# Patient Record
Sex: MALE | Race: WHITE | Employment: FULL TIME | URBAN - METROPOLITAN AREA
[De-identification: names, ages, dates, MRNs, and addresses within clinical notes are randomized per-mention and may not be internally consistent; named-entity substitution may affect disease eponyms.]

---

## 2018-03-21 ENCOUNTER — HOSPITAL ENCOUNTER (OUTPATIENT)
Age: 58
Discharge: HOME | End: 2018-03-23
Payer: COMMERCIAL

## 2018-03-21 LAB
ALBUMIN SERPL-MCNC: 4.2 G/DL (ref 3.5–5.2)
ALP BLD-CCNC: 62 U/L (ref 40–129)
ALT SERPL-CCNC: 25 U/L (ref 0–40)
ANION GAP SERPL CALCULATED.3IONS-SCNC: 17 MMOL/L (ref 7–16)
AST SERPL-CCNC: 26 U/L (ref 0–39)
BASOPHILS ABSOLUTE: 0.05 E9/L (ref 0–0.2)
BASOPHILS RELATIVE PERCENT: 0.5 % (ref 0–2)
BILIRUB SERPL-MCNC: 0.2 MG/DL (ref 0–1.2)
BUN BLDV-MCNC: 17 MG/DL (ref 6–20)
CALCIUM SERPL-MCNC: 9.2 MG/DL (ref 8.6–10.2)
CHLORIDE BLD-SCNC: 101 MMOL/L (ref 98–107)
CHOLESTEROL, TOTAL: 248 MG/DL (ref 0–199)
CO2: 22 MMOL/L (ref 22–29)
CREAT SERPL-MCNC: 1.1 MG/DL (ref 0.7–1.2)
EOSINOPHILS ABSOLUTE: 0.43 E9/L (ref 0.05–0.5)
EOSINOPHILS RELATIVE PERCENT: 4.4 % (ref 0–6)
GFR AFRICAN AMERICAN: >60
GFR NON-AFRICAN AMERICAN: >60 ML/MIN/1.73
GLUCOSE BLD-MCNC: 145 MG/DL (ref 74–109)
HBA1C MFR BLD: 7.4 % (ref 4.8–5.9)
HCT VFR BLD CALC: 42.6 % (ref 37–54)
HDLC SERPL-MCNC: 31 MG/DL
HEMOGLOBIN: 13.8 G/DL (ref 12.5–16.5)
IMMATURE GRANULOCYTES #: 0.06 E9/L
IMMATURE GRANULOCYTES %: 0.6 % (ref 0–5)
LDL CHOLESTEROL CALCULATED: ABNORMAL MG/DL (ref 0–99)
LYMPHOCYTES ABSOLUTE: 2.54 E9/L (ref 1.5–4)
LYMPHOCYTES RELATIVE PERCENT: 26.2 % (ref 20–42)
MCH RBC QN AUTO: 28.5 PG (ref 26–35)
MCHC RBC AUTO-ENTMCNC: 32.4 % (ref 32–34.5)
MCV RBC AUTO: 88 FL (ref 80–99.9)
MONOCYTES ABSOLUTE: 0.77 E9/L (ref 0.1–0.95)
MONOCYTES RELATIVE PERCENT: 8 % (ref 2–12)
NEUTROPHILS ABSOLUTE: 5.83 E9/L (ref 1.8–7.3)
NEUTROPHILS RELATIVE PERCENT: 60.3 % (ref 43–80)
PDW BLD-RTO: 13.8 FL (ref 11.5–15)
PLATELET # BLD: 189 E9/L (ref 130–450)
PMV BLD AUTO: 12.8 FL (ref 7–12)
POTASSIUM SERPL-SCNC: 4.3 MMOL/L (ref 3.5–5)
PROSTATE SPECIFIC ANTIGEN: 0.35 NG/ML (ref 0–4)
RBC # BLD: 4.84 E12/L (ref 3.8–5.8)
SODIUM BLD-SCNC: 140 MMOL/L (ref 132–146)
TOTAL PROTEIN: 6.8 G/DL (ref 6.4–8.3)
TRIGL SERPL-MCNC: 439 MG/DL (ref 0–149)
TSH SERPL DL<=0.05 MIU/L-ACNC: 2.89 UIU/ML (ref 0.27–4.2)
VITAMIN D 25-HYDROXY: 33 NG/ML (ref 30–100)
VLDLC SERPL CALC-MCNC: ABNORMAL MG/DL
WBC # BLD: 9.7 E9/L (ref 4.5–11.5)

## 2018-03-21 PROCEDURE — G0103 PSA SCREENING: HCPCS

## 2018-03-21 PROCEDURE — 80053 COMPREHEN METABOLIC PANEL: CPT

## 2018-03-21 PROCEDURE — 85025 COMPLETE CBC W/AUTO DIFF WBC: CPT

## 2018-03-21 PROCEDURE — 80061 LIPID PANEL: CPT

## 2018-03-21 PROCEDURE — 82306 VITAMIN D 25 HYDROXY: CPT

## 2018-03-21 PROCEDURE — 83036 HEMOGLOBIN GLYCOSYLATED A1C: CPT

## 2018-03-21 PROCEDURE — 84443 ASSAY THYROID STIM HORMONE: CPT

## 2018-06-30 ENCOUNTER — HOSPITAL ENCOUNTER (EMERGENCY)
Age: 58
Discharge: HOME | End: 2018-06-30
Payer: COMMERCIAL

## 2018-06-30 ENCOUNTER — APPOINTMENT (OUTPATIENT)
Dept: GENERAL RADIOLOGY | Age: 58
End: 2018-06-30
Payer: COMMERCIAL

## 2018-06-30 VITALS
HEIGHT: 67 IN | SYSTOLIC BLOOD PRESSURE: 157 MMHG | TEMPERATURE: 97.8 F | DIASTOLIC BLOOD PRESSURE: 90 MMHG | HEART RATE: 84 BPM | BODY MASS INDEX: 33.74 KG/M2 | RESPIRATION RATE: 20 BRPM | WEIGHT: 215 LBS | OXYGEN SATURATION: 98 %

## 2018-06-30 DIAGNOSIS — M54.50 ACUTE EXACERBATION OF CHRONIC LOW BACK PAIN: ICD-10-CM

## 2018-06-30 DIAGNOSIS — G89.29 ACUTE EXACERBATION OF CHRONIC LOW BACK PAIN: ICD-10-CM

## 2018-06-30 DIAGNOSIS — M54.31 SCIATICA OF RIGHT SIDE: Primary | ICD-10-CM

## 2018-06-30 PROCEDURE — 72110 X-RAY EXAM L-2 SPINE 4/>VWS: CPT

## 2018-06-30 PROCEDURE — 6360000002 HC RX W HCPCS: Performed by: NURSE PRACTITIONER

## 2018-06-30 PROCEDURE — 99212 OFFICE O/P EST SF 10 MIN: CPT

## 2018-06-30 PROCEDURE — 96372 THER/PROPH/DIAG INJ SC/IM: CPT

## 2018-06-30 RX ORDER — NAPROXEN 500 MG/1
500 TABLET ORAL 2 TIMES DAILY
Qty: 14 TABLET | Refills: 0 | Status: SHIPPED | OUTPATIENT
Start: 2018-06-30 | End: 2018-07-07

## 2018-06-30 RX ORDER — TRAMADOL HYDROCHLORIDE 50 MG/1
50 TABLET ORAL EVERY 6 HOURS PRN
Qty: 12 TABLET | Refills: 0 | Status: SHIPPED | OUTPATIENT
Start: 2018-06-30 | End: 2018-07-03

## 2018-06-30 RX ORDER — METHYLPREDNISOLONE 4 MG/1
TABLET ORAL
Qty: 21 TABLET | Status: SHIPPED | OUTPATIENT
Start: 2018-06-30 | End: 2018-07-06

## 2018-06-30 RX ORDER — KETOROLAC TROMETHAMINE 30 MG/ML
30 INJECTION, SOLUTION INTRAMUSCULAR; INTRAVENOUS ONCE
Status: COMPLETED | OUTPATIENT
Start: 2018-06-30 | End: 2018-06-30

## 2018-06-30 RX ADMIN — KETOROLAC TROMETHAMINE 30 MG: 30 INJECTION, SOLUTION INTRAMUSCULAR at 19:28

## 2018-06-30 ASSESSMENT — PAIN DESCRIPTION - ONSET
ONSET: GRADUAL
ONSET: GRADUAL

## 2018-06-30 ASSESSMENT — PAIN DESCRIPTION - FREQUENCY
FREQUENCY: CONTINUOUS
FREQUENCY: CONTINUOUS

## 2018-06-30 ASSESSMENT — PAIN DESCRIPTION - LOCATION
LOCATION: BACK
LOCATION: BACK

## 2018-06-30 ASSESSMENT — PAIN DESCRIPTION - PAIN TYPE
TYPE: ACUTE PAIN
TYPE: ACUTE PAIN

## 2018-06-30 ASSESSMENT — PAIN DESCRIPTION - DESCRIPTORS
DESCRIPTORS: ACHING;DULL;SHARP;SHOOTING
DESCRIPTORS: ACHING;DULL

## 2018-06-30 ASSESSMENT — PAIN DESCRIPTION - PROGRESSION
CLINICAL_PROGRESSION: GRADUALLY WORSENING
CLINICAL_PROGRESSION: GRADUALLY IMPROVING

## 2018-06-30 ASSESSMENT — PAIN SCALES - GENERAL
PAINLEVEL_OUTOF10: 6
PAINLEVEL_OUTOF10: 4
PAINLEVEL_OUTOF10: 6

## 2018-06-30 ASSESSMENT — PAIN DESCRIPTION - ORIENTATION
ORIENTATION: LOWER
ORIENTATION: LOWER

## 2018-06-30 NOTE — ED PROVIDER NOTES
post laminectomy and posterior fusion at the L4-5 level. 3. Degenerative disc disease at the L5-S1 level.          ------------------------- NURSING NOTES AND VITALS REVIEWED ---------------------------   The nursing notes within the ED encounter and vital signs as below have been reviewed. BP (!) 157/90   Pulse 84   Temp 97.8 °F (36.6 °C) (Oral)   Resp 20   Ht 5' 7\" (1.702 m)   Wt 215 lb (97.5 kg)   SpO2 98%   BMI 33.67 kg/m²   Oxygen Saturation Interpretation: Normal       ---------------------------------------------------PHYSICAL EXAM--------------------------------------      Constitutional/General: Alert and oriented x3, well appearing, non toxic in NAD  Head: Normocephalic and atraumatic  Eyes: Conjunctiva clear   Mouth: Oropharynx clear, handling secretions, no trismus  Neck: Supple, full ROM,   Pulmonary: Lungs clear to auscultation bilaterally, no wheezes, rales, or rhonchi. Not in respiratory distress  Cardiovascular:  Regular rate and rhythm, no murmurs, gallops, or rubs. 2+ distal pulses  Abdomen: Soft, non tender, non distended,   Extremities: Moves all extremities x 4. Warm and well perfused, has 5+ strength in the bilateral lower extremities he has pain with straight leg raising at 30°. No abnormal warmth palpable over the lumbar spine. There is no erythema. Positive pedal pulses normal color warmth and sensation present to the feet and toes.   Skin: warm and dry without rash  Neurologic: GCS 15,  Psych: Normal Affect      ------------------------------ ED COURSE/MEDICAL DECISION MAKING----------------------  Medications   ketorolac (TORADOL) injection 30 mg (30 mg Intramuscular Given 6/30/18 1928)         ED COURSE:       Medical Decision Making:    Patient with low back pain with some radiation down the right leg with a history of back pain many years ago had a surgery a few years ago and has been fine since but when he sat in a chair [employer]ori[employer] and got up he developed pain and he's had pain ever since. With movement. X-ray of the lumbar spine will be obtained. He was given Toradol 30 IM. He states the Toradol did give him some relief he feels improved movement and he feels better x-ray showed degenerative changes but no acute fractures. Patient will see his surgeon on Monday who did his back surgery artery has an appointment with Dr. Shannon Lester. I did tell him if he develops any danger signs such as fever, saddle anesthesia, urinary incontinence or symptoms he should be reevaluated. Did order him a Medrol Dosepak also Naprosyn to take when necessary for pain and also a few Ultram to take if the pain was not relieved with the above medications. I did search search the [employer] [address] prior to prescribing Ultram.    Counseling: The emergency provider has spoken with the patient and discussed todays results, in addition to providing specific details for the plan of care and counseling regarding the diagnosis and prognosis. Questions are answered at this time and they are agreeable with the plan.      --------------------------------- IMPRESSION AND DISPOSITION ---------------------------------    IMPRESSION  1. Sciatica of right side    2. Acute exacerbation of chronic low back pain        DISPOSITION  Disposition: Discharge to home  Patient condition is good      NOTE: This report was transcribed using voice recognition software.  Every effort was made to ensure accuracy; however, inadvertent computerized transcription errors may be present     ROMULO Cruz - SONIA  07/01/18 [id number]

## 2018-09-26 ENCOUNTER — HOSPITAL ENCOUNTER (OUTPATIENT)
Age: 58
Discharge: HOME | End: 2018-09-28
Payer: COMMERCIAL

## 2018-09-26 LAB
ALBUMIN SERPL-MCNC: 4.4 G/DL (ref 3.5–5.2)
ALP BLD-CCNC: 60 U/L (ref 40–129)
ALT SERPL-CCNC: 21 U/L (ref 0–40)
ANION GAP SERPL CALCULATED.3IONS-SCNC: 18 MMOL/L (ref 7–16)
AST SERPL-CCNC: 23 U/L (ref 0–39)
BASOPHILS ABSOLUTE: 0.04 E9/L (ref 0–0.2)
BASOPHILS RELATIVE PERCENT: 0.5 % (ref 0–2)
BILIRUB SERPL-MCNC: 0.2 MG/DL (ref 0–1.2)
BUN BLDV-MCNC: 12 MG/DL (ref 6–20)
CALCIUM SERPL-MCNC: 9.4 MG/DL (ref 8.6–10.2)
CHLORIDE BLD-SCNC: 101 MMOL/L (ref 98–107)
CHOLESTEROL, TOTAL: 191 MG/DL (ref 0–199)
CO2: 24 MMOL/L (ref 22–29)
CREAT SERPL-MCNC: 1.1 MG/DL (ref 0.7–1.2)
EOSINOPHILS ABSOLUTE: 0.31 E9/L (ref 0.05–0.5)
EOSINOPHILS RELATIVE PERCENT: 4 % (ref 0–6)
GFR AFRICAN AMERICAN: >60
GFR NON-AFRICAN AMERICAN: >60 ML/MIN/1.73
GLUCOSE BLD-MCNC: 135 MG/DL (ref 74–109)
HBA1C MFR BLD: 7.1 % (ref 4–5.6)
HCT VFR BLD CALC: 44 % (ref 37–54)
HDLC SERPL-MCNC: 35 MG/DL
HEMOGLOBIN: 13.7 G/DL (ref 12.5–16.5)
IMMATURE GRANULOCYTES #: 0.03 E9/L
IMMATURE GRANULOCYTES %: 0.4 % (ref 0–5)
LDL CHOLESTEROL CALCULATED: 102 MG/DL (ref 0–99)
LYMPHOCYTES ABSOLUTE: 2.55 E9/L (ref 1.5–4)
LYMPHOCYTES RELATIVE PERCENT: 32.8 % (ref 20–42)
MCH RBC QN AUTO: 28.1 PG (ref 26–35)
MCHC RBC AUTO-ENTMCNC: 31.1 % (ref 32–34.5)
MCV RBC AUTO: 90.2 FL (ref 80–99.9)
MONOCYTES ABSOLUTE: 0.6 E9/L (ref 0.1–0.95)
MONOCYTES RELATIVE PERCENT: 7.7 % (ref 2–12)
NEUTROPHILS ABSOLUTE: 4.24 E9/L (ref 1.8–7.3)
NEUTROPHILS RELATIVE PERCENT: 54.6 % (ref 43–80)
PDW BLD-RTO: 14 FL (ref 11.5–15)
PLATELET # BLD: 176 E9/L (ref 130–450)
PMV BLD AUTO: 12.2 FL (ref 7–12)
POTASSIUM SERPL-SCNC: 4.3 MMOL/L (ref 3.5–5)
RBC # BLD: 4.88 E12/L (ref 3.8–5.8)
SODIUM BLD-SCNC: 143 MMOL/L (ref 132–146)
TOTAL PROTEIN: 6.8 G/DL (ref 6.4–8.3)
TRIGL SERPL-MCNC: 269 MG/DL (ref 0–149)
TSH SERPL DL<=0.05 MIU/L-ACNC: 2.18 UIU/ML (ref 0.27–4.2)
VITAMIN D 25-HYDROXY: 45 NG/ML (ref 30–100)
VLDLC SERPL CALC-MCNC: 54 MG/DL
WBC # BLD: 7.8 E9/L (ref 4.5–11.5)

## 2018-09-26 PROCEDURE — 85025 COMPLETE CBC W/AUTO DIFF WBC: CPT

## 2018-09-26 PROCEDURE — 83036 HEMOGLOBIN GLYCOSYLATED A1C: CPT

## 2018-09-26 PROCEDURE — 80053 COMPREHEN METABOLIC PANEL: CPT

## 2018-09-26 PROCEDURE — 84443 ASSAY THYROID STIM HORMONE: CPT

## 2018-09-26 PROCEDURE — 82306 VITAMIN D 25 HYDROXY: CPT

## 2018-09-26 PROCEDURE — 80061 LIPID PANEL: CPT

## 2019-03-10 ENCOUNTER — HOSPITAL ENCOUNTER (EMERGENCY)
Age: 59
Discharge: HOME | End: 2019-03-10
Payer: COMMERCIAL

## 2019-03-10 ENCOUNTER — APPOINTMENT (OUTPATIENT)
Dept: GENERAL RADIOLOGY | Age: 59
End: 2019-03-10
Payer: COMMERCIAL

## 2019-03-10 VITALS
TEMPERATURE: 97.6 F | RESPIRATION RATE: 20 BRPM | BODY MASS INDEX: 32.89 KG/M2 | HEART RATE: 80 BPM | DIASTOLIC BLOOD PRESSURE: 81 MMHG | SYSTOLIC BLOOD PRESSURE: 153 MMHG | WEIGHT: 210 LBS | OXYGEN SATURATION: 98 %

## 2019-03-10 DIAGNOSIS — M25.461 EFFUSION OF RIGHT KNEE: ICD-10-CM

## 2019-03-10 DIAGNOSIS — M17.11 OSTEOARTHRITIS OF RIGHT KNEE, UNSPECIFIED OSTEOARTHRITIS TYPE: Primary | ICD-10-CM

## 2019-03-10 PROCEDURE — 73562 X-RAY EXAM OF KNEE 3: CPT

## 2019-03-10 PROCEDURE — 99212 OFFICE O/P EST SF 10 MIN: CPT

## 2019-03-10 ASSESSMENT — PAIN SCALES - GENERAL: PAINLEVEL_OUTOF10: 3

## 2019-03-10 ASSESSMENT — PAIN DESCRIPTION - ORIENTATION: ORIENTATION: RIGHT

## 2019-03-10 ASSESSMENT — PAIN DESCRIPTION - LOCATION: LOCATION: KNEE

## 2019-03-10 ASSESSMENT — PAIN DESCRIPTION - PAIN TYPE: TYPE: ACUTE PAIN

## 2019-03-11 ENCOUNTER — APPOINTMENT (OUTPATIENT)
Dept: ULTRASOUND IMAGING | Age: 59
End: 2019-03-11
Payer: COMMERCIAL

## 2019-03-11 ENCOUNTER — APPOINTMENT (OUTPATIENT)
Dept: GENERAL RADIOLOGY | Age: 59
End: 2019-03-11
Payer: COMMERCIAL

## 2019-03-11 ENCOUNTER — HOSPITAL ENCOUNTER (EMERGENCY)
Age: 59
Discharge: HOME | End: 2019-03-11
Attending: EMERGENCY MEDICINE
Payer: COMMERCIAL

## 2019-03-11 VITALS
TEMPERATURE: 99.2 F | HEART RATE: 93 BPM | HEIGHT: 67 IN | BODY MASS INDEX: 32.96 KG/M2 | WEIGHT: 210 LBS | DIASTOLIC BLOOD PRESSURE: 95 MMHG | RESPIRATION RATE: 16 BRPM | OXYGEN SATURATION: 97 % | SYSTOLIC BLOOD PRESSURE: 186 MMHG

## 2019-03-11 DIAGNOSIS — S86.911A STRAIN OF RIGHT KNEE, INITIAL ENCOUNTER: Primary | ICD-10-CM

## 2019-03-11 PROCEDURE — 6370000000 HC RX 637 (ALT 250 FOR IP): Performed by: PHYSICIAN ASSISTANT

## 2019-03-11 PROCEDURE — 99283 EMERGENCY DEPT VISIT LOW MDM: CPT

## 2019-03-11 PROCEDURE — 93971 EXTREMITY STUDY: CPT

## 2019-03-11 PROCEDURE — 73564 X-RAY EXAM KNEE 4 OR MORE: CPT

## 2019-03-11 RX ORDER — HYDROCODONE BITARTRATE AND ACETAMINOPHEN 5; 325 MG/1; MG/1
1 TABLET ORAL EVERY 6 HOURS PRN
Qty: 12 TABLET | Refills: 0 | Status: SHIPPED | OUTPATIENT
Start: 2019-03-11 | End: 2019-03-14

## 2019-03-11 RX ORDER — IBUPROFEN 800 MG/1
800 TABLET ORAL ONCE
Status: COMPLETED | OUTPATIENT
Start: 2019-03-11 | End: 2019-03-11

## 2019-03-11 RX ADMIN — IBUPROFEN 800 MG: 800 TABLET, FILM COATED ORAL at 14:43

## 2019-03-11 ASSESSMENT — PAIN SCALES - GENERAL
PAINLEVEL_OUTOF10: 6
PAINLEVEL_OUTOF10: 6

## 2019-03-11 ASSESSMENT — PAIN DESCRIPTION - LOCATION: LOCATION: KNEE

## 2019-03-11 ASSESSMENT — PAIN DESCRIPTION - PAIN TYPE: TYPE: ACUTE PAIN

## 2019-04-17 ENCOUNTER — HOSPITAL ENCOUNTER (OUTPATIENT)
Age: 59
Discharge: HOME | End: 2019-04-19
Payer: COMMERCIAL

## 2019-04-17 LAB
ALBUMIN SERPL-MCNC: 4.2 G/DL (ref 3.5–5.2)
ALP BLD-CCNC: 74 U/L (ref 40–129)
ALT SERPL-CCNC: 22 U/L (ref 0–40)
ANION GAP SERPL CALCULATED.3IONS-SCNC: 14 MMOL/L (ref 7–16)
AST SERPL-CCNC: 15 U/L (ref 0–39)
BASOPHILS ABSOLUTE: 0.04 E9/L (ref 0–0.2)
BASOPHILS RELATIVE PERCENT: 0.5 % (ref 0–2)
BILIRUB SERPL-MCNC: 0.3 MG/DL (ref 0–1.2)
BUN BLDV-MCNC: 14 MG/DL (ref 6–20)
CALCIUM SERPL-MCNC: 9.3 MG/DL (ref 8.6–10.2)
CHLORIDE BLD-SCNC: 102 MMOL/L (ref 98–107)
CHOLESTEROL, TOTAL: 183 MG/DL (ref 0–199)
CO2: 25 MMOL/L (ref 22–29)
CREAT SERPL-MCNC: 1 MG/DL (ref 0.7–1.2)
EOSINOPHILS ABSOLUTE: 0.35 E9/L (ref 0.05–0.5)
EOSINOPHILS RELATIVE PERCENT: 4.2 % (ref 0–6)
GFR AFRICAN AMERICAN: >60
GFR NON-AFRICAN AMERICAN: >60 ML/MIN/1.73
GLUCOSE BLD-MCNC: 218 MG/DL (ref 74–99)
HBA1C MFR BLD: 8 % (ref 4–5.6)
HCT VFR BLD CALC: 43.7 % (ref 37–54)
HDLC SERPL-MCNC: 35 MG/DL
HEMOGLOBIN: 14.1 G/DL (ref 12.5–16.5)
IMMATURE GRANULOCYTES #: 0.04 E9/L
IMMATURE GRANULOCYTES %: 0.5 % (ref 0–5)
LDL CHOLESTEROL CALCULATED: 84 MG/DL (ref 0–99)
LYMPHOCYTES ABSOLUTE: 3.04 E9/L (ref 1.5–4)
LYMPHOCYTES RELATIVE PERCENT: 36.1 % (ref 20–42)
MCH RBC QN AUTO: 28.5 PG (ref 26–35)
MCHC RBC AUTO-ENTMCNC: 32.3 % (ref 32–34.5)
MCV RBC AUTO: 88.3 FL (ref 80–99.9)
MONOCYTES ABSOLUTE: 0.53 E9/L (ref 0.1–0.95)
MONOCYTES RELATIVE PERCENT: 6.3 % (ref 2–12)
NEUTROPHILS ABSOLUTE: 4.41 E9/L (ref 1.8–7.3)
NEUTROPHILS RELATIVE PERCENT: 52.4 % (ref 43–80)
PDW BLD-RTO: 13.2 FL (ref 11.5–15)
PLATELET # BLD: 194 E9/L (ref 130–450)
PMV BLD AUTO: 12.7 FL (ref 7–12)
POTASSIUM SERPL-SCNC: 4.2 MMOL/L (ref 3.5–5)
PROSTATE SPECIFIC ANTIGEN: 0.51 NG/ML (ref 0–4)
RBC # BLD: 4.95 E12/L (ref 3.8–5.8)
SODIUM BLD-SCNC: 141 MMOL/L (ref 132–146)
TOTAL PROTEIN: 6.9 G/DL (ref 6.4–8.3)
TRIGL SERPL-MCNC: 319 MG/DL (ref 0–149)
TSH SERPL DL<=0.05 MIU/L-ACNC: 2.88 UIU/ML (ref 0.27–4.2)
VITAMIN D 25-HYDROXY: 36 NG/ML (ref 30–100)
VLDLC SERPL CALC-MCNC: 64 MG/DL
WBC # BLD: 8.4 E9/L (ref 4.5–11.5)

## 2019-04-17 PROCEDURE — 80061 LIPID PANEL: CPT

## 2019-04-17 PROCEDURE — 84443 ASSAY THYROID STIM HORMONE: CPT

## 2019-04-17 PROCEDURE — 83036 HEMOGLOBIN GLYCOSYLATED A1C: CPT

## 2019-04-17 PROCEDURE — G0103 PSA SCREENING: HCPCS

## 2019-04-17 PROCEDURE — 82306 VITAMIN D 25 HYDROXY: CPT

## 2019-04-17 PROCEDURE — 80053 COMPREHEN METABOLIC PANEL: CPT

## 2019-04-17 PROCEDURE — 85025 COMPLETE CBC W/AUTO DIFF WBC: CPT

## 2023-03-20 ENCOUNTER — TELEPHONE (OUTPATIENT)
Dept: CARDIOLOGY | Age: 63
End: 2023-03-20

## 2023-03-20 NOTE — TELEPHONE ENCOUNTER
Spoke w/ patient to confirm stress test for Wednesday, 3/22/23, starting at [place] [address]. Instructions given and medications reviewed. Pt instructed to hold DM meds the morning of the test.  All questions answered. Yes

## 2023-03-22 ENCOUNTER — HOSPITAL ENCOUNTER (OUTPATIENT)
Dept: CARDIOLOGY | Age: 63
Discharge: HOME | End: 2023-03-22

## 2023-03-22 VITALS
HEIGHT: 67 IN | RESPIRATION RATE: 16 BRPM | DIASTOLIC BLOOD PRESSURE: 78 MMHG | HEART RATE: 75 BPM | OXYGEN SATURATION: 97 % | BODY MASS INDEX: 30.61 KG/M2 | SYSTOLIC BLOOD PRESSURE: 156 MMHG | WEIGHT: 195 LBS

## 2023-03-22 LAB
LV EF: 55 %
LVEF MODALITY: NORMAL

## 2023-03-22 PROCEDURE — 78452 HT MUSCLE IMAGE SPECT MULT: CPT

## 2023-03-22 PROCEDURE — 93017 CV STRESS TEST TRACING ONLY: CPT

## 2023-03-22 PROCEDURE — A9502 TC99M TETROFOSMIN: HCPCS | Performed by: INTERNAL MEDICINE

## 2023-03-22 PROCEDURE — 2580000003 HC RX 258: Performed by: INTERNAL MEDICINE

## 2023-03-22 PROCEDURE — 3430000000 HC RX DIAGNOSTIC RADIOPHARMACEUTICAL: Performed by: INTERNAL MEDICINE

## 2023-03-22 RX ORDER — SODIUM CHLORIDE 0.9 % (FLUSH) 0.9 %
10 SYRINGE (ML) INJECTION PRN
Status: DISCONTINUED | OUTPATIENT
Start: 2023-03-22 | End: 2023-03-23 | Stop reason: HOSPADM

## 2023-03-22 RX ORDER — OMEPRAZOLE 40 MG/1
CAPSULE, DELAYED RELEASE ORAL
COMMUNITY
Start: 2023-03-11

## 2023-03-22 RX ORDER — SILDENAFIL 100 MG/1
TABLET, FILM COATED ORAL
COMMUNITY
Start: 2023-01-10

## 2023-03-22 RX ORDER — LINAGLIPTIN AND METFORMIN HYDROCHLORIDE 5; 1000 MG/1; MG/1
TABLET, FILM COATED, EXTENDED RELEASE ORAL DAILY
COMMUNITY
Start: 2023-03-11

## 2023-03-22 RX ADMIN — SODIUM CHLORIDE, PRESERVATIVE FREE 10 ML: 5 INJECTION INTRAVENOUS at 09:45

## 2023-03-22 RX ADMIN — SODIUM CHLORIDE, PRESERVATIVE FREE 10 ML: 5 INJECTION INTRAVENOUS at 08:07

## 2023-03-22 RX ADMIN — TETROFOSMIN 27.4 MILLICURIE: 0.23 INJECTION, POWDER, LYOPHILIZED, FOR SOLUTION INTRAVENOUS at 09:45

## 2023-03-22 RX ADMIN — TETROFOSMIN 8.4 MILLICURIE: 0.23 INJECTION, POWDER, LYOPHILIZED, FOR SOLUTION INTRAVENOUS at 08:08

## 2023-03-22 NOTE — PROCEDURES
[id number] [facility], [facility] [address] and Vascular Lab - [address] NEERU mulligan, [address] [place], [place] [phone]                  Exercise Stress Nuclear Gated SPECT Study    [facility] Account Number: [de-identified]    :  1960      Sex: male              Date of Study:  3/22/2023    Height: 5' 7\" (170.2 cm)  Weight: 195 lb (88.5 kg)     Ordering Provider: Kingsley Jauregui DO          PCP: Kingsley Jauregui DO      Cardiologist: none                        Interpreting Physician: Bhavani Gao MD  _________________________________________________________________________________    Indication: Chest pain. Clinical History:   Patient has no known history of coronary artery disease. Resting ECG: Sinus rhythm at 75 bpm.    Exercise: The patient exercised using a Austin protocol, completing 8:59 minutes and reaching an estimated work load of 66.2 metabolic equivalents (METS). Resting HR was 75. Peak exercise heart rate was 137 ( 86% of maximum predicted heart rate for age). Baseline /78. Peak exercise /86. The blood pressure response to exercise was normal      Exercise was terminated due to dyspnea and heart rate attained. The patient experienced no chest pain with exercise. Pulse oximetry was used to monitor oxygen saturation during the stress test.  The study was performed on Room Air. The resting pulse oximeter was 97%. The post stress O2 saturation seen during exercise was 96 %. Exercise ECG:   The patient demonstrated occasional PAC's during exercise. EKG during exercise and recovery revealed no ischemia. IMAGING: Myocardial perfusion imaging was performed at rest 30-35 minutes following the intravenous injection of 8.4 mCi of (Tc-tetrofosmin) followed by 10 ml of Normal Saline. At peak exercise, the patient was injected intravenously with 27.4 mCi of (Tc-tetrofosmin) followed by 10 ml of Normal Saline.   Gated post-stress

## 2023-03-22 NOTE — DISCHARGE INSTRUCTIONS
[address] [address] and Vascular Lab      Instructions to Patients    The following are the instructions for patients who have had a procedure in our office today. Patient name: Tiny Fuel    Radionuclide Activity: 40mCi of 99mTc-Tetrofosmin (Myoview)    Date Administered: 3/22/2023    Expires: 48 hours after scheduled appointment time      Patient may resume normal activity unless otherwise instructed. Patient may resume medications as normal.  If the need should arise, patient may call [phone] between the hours of 8:00am-4:30pm.  After hours there is at least one physician on-call at all times for those patients needing assistance. Patients may call [phone] and the answering service will direct the patient to one of our physicians for assistance. After the patient's test if they are going to be leaving from an airport in the near future they should take this letter with them to verify the test and radionuclide used for their test.      This letter verifies that the above named bearer received an injection of a radionuclide for medical purpose/usage only.         Electronically signed by Dennis Stanley on 3/22/2023 at 9:18 AM

## 2024-04-14 ENCOUNTER — APPOINTMENT (OUTPATIENT)
Dept: GENERAL RADIOLOGY | Age: 64
End: 2024-04-14
Payer: COMMERCIAL

## 2024-04-14 ENCOUNTER — HOSPITAL ENCOUNTER (OUTPATIENT)
Age: 64
Setting detail: OBSERVATION
Discharge: HOME | End: 2024-04-15
Attending: EMERGENCY MEDICINE | Admitting: INTERNAL MEDICINE
Payer: COMMERCIAL

## 2024-04-14 ENCOUNTER — APPOINTMENT (OUTPATIENT)
Dept: CT IMAGING | Age: 64
End: 2024-04-14
Attending: EMERGENCY MEDICINE
Payer: COMMERCIAL

## 2024-04-14 DIAGNOSIS — G45.9 TIA (TRANSIENT ISCHEMIC ATTACK): Primary | ICD-10-CM

## 2024-04-14 LAB
ALBUMIN SERPL-MCNC: 3.8 G/DL (ref 3.5–5.2)
ALP SERPL-CCNC: 65 U/L (ref 40–129)
ALT SERPL-CCNC: 12 U/L (ref 0–40)
AMPHET UR QL SCN: NEGATIVE
ANION GAP SERPL CALCULATED.3IONS-SCNC: 10 MMOL/L (ref 7–16)
APAP SERPL-MCNC: <5 UG/ML (ref 10–30)
AST SERPL-CCNC: 14 U/L (ref 0–39)
BARBITURATES UR QL SCN: NEGATIVE
BASOPHILS # BLD: 0.02 K/UL (ref 0–0.2)
BASOPHILS NFR BLD: 0 % (ref 0–2)
BENZODIAZ UR QL: NEGATIVE
BILIRUB SERPL-MCNC: 0.2 MG/DL (ref 0–1.2)
BNP SERPL-MCNC: 59 PG/ML (ref 0–125)
BUN SERPL-MCNC: 11 MG/DL (ref 6–23)
BUPRENORPHINE UR QL: NEGATIVE
CALCIUM SERPL-MCNC: 8.5 MG/DL (ref 8.6–10.2)
CANNABINOIDS UR QL SCN: POSITIVE
CHLORIDE SERPL-SCNC: 104 MMOL/L (ref 98–107)
CO2 SERPL-SCNC: 24 MMOL/L (ref 22–29)
COCAINE UR QL SCN: NEGATIVE
CREAT SERPL-MCNC: 0.9 MG/DL (ref 0.7–1.2)
EOSINOPHIL # BLD: 0.33 K/UL (ref 0.05–0.5)
EOSINOPHILS RELATIVE PERCENT: 3 % (ref 0–6)
ERYTHROCYTE [DISTWIDTH] IN BLOOD BY AUTOMATED COUNT: 14.1 % (ref 11.5–15)
ETHANOLAMINE SERPL-MCNC: <10 MG/DL
FENTANYL UR QL: NEGATIVE
GFR SERPL CREATININE-BSD FRML MDRD: >90 ML/MIN/1.73M2
GLUCOSE BLD-MCNC: 108 MG/DL (ref 74–99)
GLUCOSE SERPL-MCNC: 125 MG/DL (ref 74–99)
HCT VFR BLD AUTO: 40.5 % (ref 37–54)
HGB BLD-MCNC: 13.3 G/DL (ref 12.5–16.5)
IMM GRANULOCYTES # BLD AUTO: 0.03 K/UL (ref 0–0.58)
IMM GRANULOCYTES NFR BLD: 0 % (ref 0–5)
LYMPHOCYTES NFR BLD: 2.67 K/UL (ref 1.5–4)
LYMPHOCYTES RELATIVE PERCENT: 24 % (ref 20–42)
MAGNESIUM SERPL-MCNC: 2 MG/DL (ref 1.6–2.6)
MCH RBC QN AUTO: 28.9 PG (ref 26–35)
MCHC RBC AUTO-ENTMCNC: 32.8 G/DL (ref 32–34.5)
MCV RBC AUTO: 88 FL (ref 80–99.9)
METHADONE UR QL: NEGATIVE
MONOCYTES NFR BLD: 0.66 K/UL (ref 0.1–0.95)
MONOCYTES NFR BLD: 6 % (ref 2–12)
NEUTROPHILS NFR BLD: 66 % (ref 43–80)
NEUTS SEG NFR BLD: 7.23 K/UL (ref 1.8–7.3)
OPIATES UR QL SCN: NEGATIVE
OXYCODONE UR QL SCN: NEGATIVE
PCP UR QL SCN: NEGATIVE
PLATELET # BLD AUTO: 165 K/UL (ref 130–450)
PMV BLD AUTO: 12 FL (ref 7–12)
POTASSIUM SERPL-SCNC: 3.6 MMOL/L (ref 3.5–5)
PROT SERPL-MCNC: 6.2 G/DL (ref 6.4–8.3)
RBC # BLD AUTO: 4.6 M/UL (ref 3.8–5.8)
SALICYLATES SERPL-MCNC: <0.3 MG/DL (ref 0–30)
SODIUM SERPL-SCNC: 138 MMOL/L (ref 132–146)
TEST INFORMATION: ABNORMAL
TOXIC TRICYCLIC SC,BLOOD: NEGATIVE
TROPONIN I SERPL HS-MCNC: 15 NG/L (ref 0–11)
TROPONIN I SERPL HS-MCNC: 8 NG/L (ref 0–11)
WBC OTHER # BLD: 10.9 K/UL (ref 4.5–11.5)

## 2024-04-14 PROCEDURE — 80307 DRUG TEST PRSMV CHEM ANLYZR: CPT

## 2024-04-14 PROCEDURE — 99285 EMERGENCY DEPT VISIT HI MDM: CPT

## 2024-04-14 PROCEDURE — 93005 ELECTROCARDIOGRAM TRACING: CPT | Performed by: EMERGENCY MEDICINE

## 2024-04-14 PROCEDURE — 80053 COMPREHEN METABOLIC PANEL: CPT

## 2024-04-14 PROCEDURE — G0480 DRUG TEST DEF 1-7 CLASSES: HCPCS

## 2024-04-14 PROCEDURE — 83735 ASSAY OF MAGNESIUM: CPT

## 2024-04-14 PROCEDURE — 83880 ASSAY OF NATRIURETIC PEPTIDE: CPT

## 2024-04-14 PROCEDURE — 71045 X-RAY EXAM CHEST 1 VIEW: CPT

## 2024-04-14 PROCEDURE — 85025 COMPLETE CBC W/AUTO DIFF WBC: CPT

## 2024-04-14 PROCEDURE — G0378 HOSPITAL OBSERVATION PER HR: HCPCS

## 2024-04-14 PROCEDURE — 80143 DRUG ASSAY ACETAMINOPHEN: CPT

## 2024-04-14 PROCEDURE — 6370000000 HC RX 637 (ALT 250 FOR IP): Performed by: EMERGENCY MEDICINE

## 2024-04-14 PROCEDURE — 82962 GLUCOSE BLOOD TEST: CPT

## 2024-04-14 PROCEDURE — 84484 ASSAY OF TROPONIN QUANT: CPT

## 2024-04-14 PROCEDURE — 80179 DRUG ASSAY SALICYLATE: CPT

## 2024-04-14 PROCEDURE — 70450 CT HEAD/BRAIN W/O DYE: CPT

## 2024-04-14 RX ORDER — PANTOPRAZOLE SODIUM 40 MG/1
40 TABLET, DELAYED RELEASE ORAL
Status: DISCONTINUED | OUTPATIENT
Start: 2024-04-15 | End: 2024-04-15 | Stop reason: HOSPADM

## 2024-04-14 RX ORDER — SODIUM CHLORIDE 0.9 % (FLUSH) 0.9 %
5-40 SYRINGE (ML) INJECTION EVERY 12 HOURS SCHEDULED
Status: DISCONTINUED | OUTPATIENT
Start: 2024-04-14 | End: 2024-04-15 | Stop reason: HOSPADM

## 2024-04-14 RX ORDER — ONDANSETRON 4 MG/1
4 TABLET, ORALLY DISINTEGRATING ORAL EVERY 8 HOURS PRN
Status: DISCONTINUED | OUTPATIENT
Start: 2024-04-14 | End: 2024-04-15 | Stop reason: HOSPADM

## 2024-04-14 RX ORDER — ASPIRIN 81 MG/1
324 TABLET, CHEWABLE ORAL ONCE
Status: COMPLETED | OUTPATIENT
Start: 2024-04-14 | End: 2024-04-14

## 2024-04-14 RX ORDER — ASPIRIN 81 MG/1
81 TABLET, CHEWABLE ORAL DAILY
Status: DISCONTINUED | OUTPATIENT
Start: 2024-04-15 | End: 2024-04-15 | Stop reason: HOSPADM

## 2024-04-14 RX ORDER — MULTIVITAMIN WITH IRON
1 TABLET ORAL DAILY
Status: DISCONTINUED | OUTPATIENT
Start: 2024-04-15 | End: 2024-04-15 | Stop reason: HOSPADM

## 2024-04-14 RX ORDER — CLOPIDOGREL 300 MG/1
300 TABLET, FILM COATED ORAL ONCE
Status: COMPLETED | OUTPATIENT
Start: 2024-04-14 | End: 2024-04-14

## 2024-04-14 RX ORDER — POLYETHYLENE GLYCOL 3350 17 G/17G
17 POWDER, FOR SOLUTION ORAL DAILY PRN
Status: DISCONTINUED | OUTPATIENT
Start: 2024-04-14 | End: 2024-04-15 | Stop reason: HOSPADM

## 2024-04-14 RX ORDER — ENOXAPARIN SODIUM 100 MG/ML
40 INJECTION SUBCUTANEOUS DAILY
Status: DISCONTINUED | OUTPATIENT
Start: 2024-04-15 | End: 2024-04-15 | Stop reason: HOSPADM

## 2024-04-14 RX ORDER — ASPIRIN 300 MG/1
300 SUPPOSITORY RECTAL DAILY
Status: DISCONTINUED | OUTPATIENT
Start: 2024-04-15 | End: 2024-04-15 | Stop reason: HOSPADM

## 2024-04-14 RX ORDER — ATORVASTATIN CALCIUM 40 MG/1
40 TABLET, FILM COATED ORAL NIGHTLY
Status: DISCONTINUED | OUTPATIENT
Start: 2024-04-14 | End: 2024-04-15 | Stop reason: HOSPADM

## 2024-04-14 RX ORDER — SODIUM CHLORIDE 9 MG/ML
INJECTION, SOLUTION INTRAVENOUS PRN
Status: DISCONTINUED | OUTPATIENT
Start: 2024-04-14 | End: 2024-04-15 | Stop reason: HOSPADM

## 2024-04-14 RX ORDER — SODIUM CHLORIDE 0.9 % (FLUSH) 0.9 %
5-40 SYRINGE (ML) INJECTION PRN
Status: DISCONTINUED | OUTPATIENT
Start: 2024-04-14 | End: 2024-04-15 | Stop reason: HOSPADM

## 2024-04-14 RX ORDER — ONDANSETRON 2 MG/ML
4 INJECTION INTRAMUSCULAR; INTRAVENOUS EVERY 6 HOURS PRN
Status: DISCONTINUED | OUTPATIENT
Start: 2024-04-14 | End: 2024-04-15 | Stop reason: HOSPADM

## 2024-04-14 RX ORDER — EZETIMIBE 10 MG/1
10 TABLET ORAL DAILY
Status: DISCONTINUED | OUTPATIENT
Start: 2024-04-15 | End: 2024-04-15 | Stop reason: HOSPADM

## 2024-04-14 RX ADMIN — CLOPIDOGREL BISULFATE 300 MG: 300 TABLET, FILM COATED ORAL at 17:05

## 2024-04-14 RX ADMIN — ASPIRIN 324 MG: 81 TABLET, CHEWABLE ORAL at 17:05

## 2024-04-14 ASSESSMENT — PAIN - FUNCTIONAL ASSESSMENT: PAIN_FUNCTIONAL_ASSESSMENT: NONE - DENIES PAIN

## 2024-04-14 NOTE — ED PROVIDER NOTES
[facility] EMERGENCY DEPARTMENT  EMERGENCY DEPARTMENT ENCOUNTER        Pt Name: Grupo Mcgrath  MRN: [id number]  Birthdate 1960  Date of evaluation: 4/14/2024  Provider: Vidal Abdalla DO  PCP: Patrice Ellington DO  Note Started: 12:08 PM EDT 4/14/24    CHIEF COMPLAINT       Chief Complaint   Patient presents with    Altered Mental Status     Patient with episode of confusion, rt sided weakness and rt sided facial droop. Symptoms resolved prior to arrival, states symptoms started 30 minutes prior to arrival.        HISTORY OF PRESENT ILLNESS: 1 or more Elements   History From: Patient, EMS    Limitations to history : None    Grupo Mcgrath is a 63 y.o. male who presents via EMS for strokelike episode.  Reportedly woke up in his normal state of health at 9 AM, at approximately 11:30 AM had an episode of significant confusion right-sided weakness facial droop, did not fall or hit his head, patient states he has no memory of the event.  By the time EMS arrived symptoms had resolved, bystanders reported them that lasted about 5 minutes.  Blood pressure was elevated blood sugar greater than 100.  He denies personal history of stroke but does state a family history of brain tumors.  States he has no known brain tumor current smoker states he feels wiped out now, did not bite his tongue no loss of bowel or bladder control per patient    Nursing Notes were all reviewed and agreed with or any disagreements were addressed in the HPI.      REVIEW OF EXTERNAL NOTE :       Cardiology stress test from 3/22/2023, was ordered for chest pain, was deemed low risk at that time        REVIEW OF SYSTEMS :           Positives and Pertinent negatives as per HPI.     SURGICAL HISTORY     Past Surgical History:   Procedure Laterality Date    BACK SURGERY         CURRENTMEDICATIONS       Previous Medications    CHOLECALCIFEROL (VITAMIN D PO)    Take by mouth    DICLOFENAC SODIUM 1 % GEL

## 2024-04-15 ENCOUNTER — APPOINTMENT (OUTPATIENT)
Dept: CT IMAGING | Age: 64
End: 2024-04-15
Attending: INTERNAL MEDICINE
Payer: COMMERCIAL

## 2024-04-15 ENCOUNTER — APPOINTMENT (OUTPATIENT)
Dept: MRI IMAGING | Age: 64
End: 2024-04-15
Payer: COMMERCIAL

## 2024-04-15 VITALS
TEMPERATURE: 97.7 F | HEIGHT: 67 IN | OXYGEN SATURATION: 96 % | RESPIRATION RATE: 14 BRPM | WEIGHT: 185 LBS | HEART RATE: 69 BPM | DIASTOLIC BLOOD PRESSURE: 70 MMHG | BODY MASS INDEX: 29.03 KG/M2 | SYSTOLIC BLOOD PRESSURE: 134 MMHG

## 2024-04-15 PROBLEM — E11.69 DYSLIPIDEMIA ASSOCIATED WITH TYPE 2 DIABETES MELLITUS (HCC): Status: ACTIVE | Noted: 2024-04-15

## 2024-04-15 PROBLEM — E11.9 TYPE II DIABETES MELLITUS (HCC): Status: ACTIVE | Noted: 2024-04-15

## 2024-04-15 PROBLEM — I10 PRIMARY HYPERTENSION: Status: ACTIVE | Noted: 2024-04-15

## 2024-04-15 PROBLEM — E78.5 DYSLIPIDEMIA ASSOCIATED WITH TYPE 2 DIABETES MELLITUS (HCC): Status: ACTIVE | Noted: 2024-04-15

## 2024-04-15 LAB
ANION GAP SERPL CALCULATED.3IONS-SCNC: 11 MMOL/L (ref 7–16)
BUN SERPL-MCNC: 13 MG/DL (ref 6–23)
CALCIUM SERPL-MCNC: 9.1 MG/DL (ref 8.6–10.2)
CHLORIDE SERPL-SCNC: 106 MMOL/L (ref 98–107)
CHOLEST SERPL-MCNC: 177 MG/DL
CO2 SERPL-SCNC: 24 MMOL/L (ref 22–29)
CREAT SERPL-MCNC: 1 MG/DL (ref 0.7–1.2)
EKG ATRIAL RATE: 58 BPM
EKG P AXIS: 60 DEGREES
EKG P-R INTERVAL: 164 MS
EKG Q-T INTERVAL: 440 MS
EKG QRS DURATION: 102 MS
EKG QTC CALCULATION (BAZETT): 431 MS
EKG R AXIS: -16 DEGREES
EKG T AXIS: 43 DEGREES
EKG VENTRICULAR RATE: 58 BPM
ERYTHROCYTE [DISTWIDTH] IN BLOOD BY AUTOMATED COUNT: 14.2 % (ref 11.5–15)
GFR SERPL CREATININE-BSD FRML MDRD: 86 ML/MIN/1.73M2
GLUCOSE BLD-MCNC: 110 MG/DL (ref 74–99)
GLUCOSE BLD-MCNC: 81 MG/DL (ref 74–99)
GLUCOSE SERPL-MCNC: 88 MG/DL (ref 74–99)
HBA1C MFR BLD: 5.7 % (ref 4–5.6)
HCT VFR BLD AUTO: 40.4 % (ref 37–54)
HDLC SERPL-MCNC: 29 MG/DL
HGB BLD-MCNC: 13.2 G/DL (ref 12.5–16.5)
LDLC SERPL CALC-MCNC: 80 MG/DL
MCH RBC QN AUTO: 28.7 PG (ref 26–35)
MCHC RBC AUTO-ENTMCNC: 32.7 G/DL (ref 32–34.5)
MCV RBC AUTO: 87.8 FL (ref 80–99.9)
PLATELET # BLD AUTO: 168 K/UL (ref 130–450)
PMV BLD AUTO: 12.4 FL (ref 7–12)
POTASSIUM SERPL-SCNC: 4.2 MMOL/L (ref 3.5–5)
RBC # BLD AUTO: 4.6 M/UL (ref 3.8–5.8)
SODIUM SERPL-SCNC: 141 MMOL/L (ref 132–146)
TRIGL SERPL-MCNC: 338 MG/DL
VLDLC SERPL CALC-MCNC: 68 MG/DL
WBC OTHER # BLD: 7.8 K/UL (ref 4.5–11.5)

## 2024-04-15 PROCEDURE — 85027 COMPLETE CBC AUTOMATED: CPT

## 2024-04-15 PROCEDURE — G0378 HOSPITAL OBSERVATION PER HR: HCPCS

## 2024-04-15 PROCEDURE — 2580000003 HC RX 258

## 2024-04-15 PROCEDURE — 97165 OT EVAL LOW COMPLEX 30 MIN: CPT

## 2024-04-15 PROCEDURE — 83036 HEMOGLOBIN GLYCOSYLATED A1C: CPT

## 2024-04-15 PROCEDURE — 6370000000 HC RX 637 (ALT 250 FOR IP)

## 2024-04-15 PROCEDURE — 97161 PT EVAL LOW COMPLEX 20 MIN: CPT

## 2024-04-15 PROCEDURE — 93010 ELECTROCARDIOGRAM REPORT: CPT | Performed by: INTERNAL MEDICINE

## 2024-04-15 PROCEDURE — 96372 THER/PROPH/DIAG INJ SC/IM: CPT

## 2024-04-15 PROCEDURE — 36415 COLL VENOUS BLD VENIPUNCTURE: CPT

## 2024-04-15 PROCEDURE — 70498 CT ANGIOGRAPHY NECK: CPT

## 2024-04-15 PROCEDURE — 82962 GLUCOSE BLOOD TEST: CPT

## 2024-04-15 PROCEDURE — 70551 MRI BRAIN STEM W/O DYE: CPT

## 2024-04-15 PROCEDURE — 80048 BASIC METABOLIC PNL TOTAL CA: CPT

## 2024-04-15 PROCEDURE — 6360000004 HC RX CONTRAST MEDICATION: Performed by: RADIOLOGY

## 2024-04-15 PROCEDURE — 70496 CT ANGIOGRAPHY HEAD: CPT

## 2024-04-15 PROCEDURE — 6360000002 HC RX W HCPCS

## 2024-04-15 PROCEDURE — 80061 LIPID PANEL: CPT

## 2024-04-15 PROCEDURE — 6370000000 HC RX 637 (ALT 250 FOR IP): Performed by: INTERNAL MEDICINE

## 2024-04-15 RX ORDER — GLUCAGON 1 MG/ML
1 KIT INJECTION PRN
Status: DISCONTINUED | OUTPATIENT
Start: 2024-04-15 | End: 2024-04-15 | Stop reason: HOSPADM

## 2024-04-15 RX ORDER — FENOFIBRATE 160 MG/1
160 TABLET ORAL DAILY
Qty: 30 TABLET | Refills: 0 | Status: SHIPPED | OUTPATIENT
Start: 2024-04-16

## 2024-04-15 RX ORDER — ASPIRIN 81 MG/1
81 TABLET, CHEWABLE ORAL DAILY
Qty: 30 TABLET | Refills: 0 | Status: SHIPPED | OUTPATIENT
Start: 2024-04-16

## 2024-04-15 RX ORDER — ATORVASTATIN CALCIUM 40 MG/1
40 TABLET, FILM COATED ORAL NIGHTLY
Qty: 30 TABLET | Refills: 0 | Status: SHIPPED | OUTPATIENT
Start: 2024-04-15

## 2024-04-15 RX ORDER — INSULIN LISPRO 100 [IU]/ML
0-4 INJECTION, SOLUTION INTRAVENOUS; SUBCUTANEOUS
Status: DISCONTINUED | OUTPATIENT
Start: 2024-04-15 | End: 2024-04-15 | Stop reason: HOSPADM

## 2024-04-15 RX ORDER — INSULIN LISPRO 100 [IU]/ML
0-4 INJECTION, SOLUTION INTRAVENOUS; SUBCUTANEOUS NIGHTLY
Status: DISCONTINUED | OUTPATIENT
Start: 2024-04-15 | End: 2024-04-15 | Stop reason: HOSPADM

## 2024-04-15 RX ORDER — FENOFIBRATE 160 MG/1
160 TABLET ORAL DAILY
Status: DISCONTINUED | OUTPATIENT
Start: 2024-04-15 | End: 2024-04-15 | Stop reason: HOSPADM

## 2024-04-15 RX ORDER — DEXTROSE MONOHYDRATE 100 MG/ML
INJECTION, SOLUTION INTRAVENOUS CONTINUOUS PRN
Status: DISCONTINUED | OUTPATIENT
Start: 2024-04-15 | End: 2024-04-15 | Stop reason: HOSPADM

## 2024-04-15 RX ORDER — LOSARTAN POTASSIUM 25 MG/1
25 TABLET ORAL DAILY
Status: DISCONTINUED | OUTPATIENT
Start: 2024-04-15 | End: 2024-04-15 | Stop reason: HOSPADM

## 2024-04-15 RX ADMIN — ASPIRIN 81 MG: 81 TABLET, CHEWABLE ORAL at 08:52

## 2024-04-15 RX ADMIN — MULTIVITAMIN TABLET 1 TABLET: TABLET at 06:24

## 2024-04-15 RX ADMIN — ENOXAPARIN SODIUM 40 MG: 100 INJECTION SUBCUTANEOUS at 08:50

## 2024-04-15 RX ADMIN — PANTOPRAZOLE SODIUM 40 MG: 40 TABLET, DELAYED RELEASE ORAL at 06:24

## 2024-04-15 RX ADMIN — SODIUM CHLORIDE, PRESERVATIVE FREE 10 ML: 5 INJECTION INTRAVENOUS at 08:50

## 2024-04-15 RX ADMIN — IOPAMIDOL 75 ML: 755 INJECTION, SOLUTION INTRAVENOUS at 13:46

## 2024-04-15 RX ADMIN — FENOFIBRATE 160 MG: 160 TABLET ORAL at 17:02

## 2024-04-15 RX ADMIN — LOSARTAN POTASSIUM 25 MG: 25 TABLET, FILM COATED ORAL at 08:52

## 2024-04-15 NOTE — H&P
Negative 12/03/2015 05:08 PM    BLOODU Negative 12/03/2015 05:08 PM    SPECGRAV <=1.005 12/03/2015 05:08 PM    GLUCOSEU Negative 12/03/2015 05:08 PM       Radiology:           CT HEAD WO CONTRAST   Final Result   No acute intracranial abnormality.         XR CHEST PORTABLE   Final Result   No acute process.         MRI brain without contrast    (Results Pending)   CTA HEAD W CONTRAST    (Results Pending)   CTA NECK W CONTRAST    (Results Pending)       ASSESSMENT:    Active Hospital Problems    Diagnosis Date Noted    Type II diabetes mellitus (HCC) [E11.9] 04/15/2024    Primary hypertension [I10] 04/15/2024    TIA (transient ischemic attack) [G45.9] 04/14/2024   DYSLIPIDEMIA  HTN    PLAN:  NEUROLOGY  CTA OF HEAD AND NECK   SSI  MRI   TRIGLIDE  LIPITOR  ASPIRIN  LOSARTAN    DVT Prophylaxis:  LOVENOX  Diet: ADULT DIET; Regular; 5 carb choices (75 gm/meal); Low Fat (less than or equal to 50 gm/day)  Code Status: Full Code    PT/OT Eval Status:  ORDERED    Dispo -  HOME    Electronically signed by Benoit Leyva DO on 4/15/2024 at 12:22 PM FACOI       Thank you Patrice Ellington DO for the opportunity to be involved in this patient's care. If you have any questions or concerns please feel free to contact me at [phone]

## 2024-04-15 NOTE — ACP (ADVANCE CARE PLANNING)
Advance Care Planning   Healthcare Decision Maker:    Primary Decision Maker: Jong Mcgrath - [facility] - [phone]    Click here to complete Healthcare Decision Makers including selection of the Healthcare Decision Maker Relationship (ie \"Primary\").     Electronically signed by Je Tijerina RN CM on 4/15/2024 at 9:33 AM

## 2024-04-15 NOTE — PROGRESS NOTES
OCCUPATIONAL THERAPY INITIAL EVALUATION AND DISCHARGE  [facility] [address] [place], [place]       Date:4/15/2024                                                  Patient Name: Grupo Mcgrath  MRN: [id number]  : 1960  Room: [address] [place] [address]    Evaluating OT: Pj West OTR/L [id number]    Referring Provider:     Ofe Muñoz APRN - CNP        Specific Provider Orders/Date: OT evaluation and treatment 24    Diagnosis:  TIA (transient ischemic attack) [G45.9]      Pertinent Medical History:  has a past medical history of Acid reflux, Diabetes mellitus (HCC), and Hyperlipidemia.   Pt admitted to the hospital  with stroke like symptoms     Orders received, chart reviewed, eval complete.     Precautions: none      Assessment of current deficits: N/A    OT PLAN OF CARE   OT POC based on physician orders, patient diagnosis and results of clinical assessment    Frequency/Duration: N/A   Specific OT Treatment to include: N/A    Modified [place] Scale   Score     Description  0             No symptoms  1             No significant disability despite symptoms  2             Slight disability; able to look after own affairs  3             Moderate disability; able to ambulate without assist/ requires assist with ADLs  4             Moderate/Severe disability;requires assist to ambulate/assist with ADLs  5             Severe disability;bedridden/incontinent   6               Score:   0  Reports symptoms have resolved     Recommended Adaptive Equipment: no needs      Home Living:  Pt lives with spouse   in a 1 story house with 1 step(s) to enter   Bedroom setup: main level standard bed    Bathroom setup: main level tub shower with standard commode    Equipment owned: none   Basement: laundry     Prior Level of Function:  Pt I with ADLs , I with IADLs, and completed functional mobility with no AD   Falls: no   Driving: yes

## 2024-04-15 NOTE — PROGRESS NOTES
Room #:  8204/8204-A  Patient Name: Grupo Mcgrath  Referring Provider:   Electronically Signed By  Ofe Muñoz APRN - CNP  NPI:  [id number] Date  Apr 14, 2024  9:10 PM         PHYSICAL THERAPY INITIAL EVALUATION    Plan of care: No skilled needs identified; will discharge from services. If condition changes, please reorder physical therapy.     Placement recommendation: home   Equipment recommendation:  None        AM-PAC Basic Mobility   23/24          Order:  EVALUATE AND TREAT  Diagnosis/Problem list:      Patient Active Problem List   Diagnosis    TIA (transient ischemic attack)    Type II diabetes mellitus (HCC)    Primary hypertension       Past medical history:       Diagnosis Date    Acid reflux     Diabetes mellitus (HCC)     Hyperlipidemia    ;      Procedure Laterality Date    BACK SURGERY         The admitting diagnosis and active problem list as listed above have been reviewed prior to the initiation of this evaluation.    Precautions: falls , FWB (full weight bearing)  Social history: Patient lives with spouse  in a [place] home with 1 steps to enter.   Prior Level of Function: Patient ambulated independently   Equipment owned: None,      Mentation: alert, cooperative, oriented x 3 and follows directions,     ROM: wfl   STRENGTH: wfl 5/5 symmetrical   PAIN: (measured on a visual analog scale with 0=no pain and 10=excruciating pain) 0/10.     FUNCTIONAL ASSESSMENT   Bed Mobility- Supine to sit- Independent      Scooting-Independent     Sit to supine- Independent    Transfers-Sit to stand-  Independent     Gait:  Patient ambulated 40 feet using no device with  Independent  No LOB  symmetrical  reciprocating gait pattern.    Steps:  Not assessed      Balance: sit-good       stand good     Edema: \no  Endurance: good     Treatment: Eval only    Rehab Potential: good   Patient’s Goal: home    ASSESSMENT  No PT needs at this time.      Evaluation Complexity: Low Complexity PT evaluation ([id number])     Time

## 2024-04-15 NOTE — PROGRESS NOTES
4 Eyes Skin Assessment     NAME:  Grupo Mcgrath  YOB: 1960  MEDICAL RECORD NUMBER:  [id number]    The patient is being assessed for  Admission    I agree that at least one RN has performed a thorough Head to Toe Skin Assessment on the patient. ALL assessment sites listed below have been assessed.      Areas assessed by both nurses:    Head, Face, Ears, Shoulders, Back, Chest, Arms, Elbows, Hands, Sacrum. Buttock, Coccyx, Ischium, and Legs. Feet and Heels        Does the Patient have a Wound? No noted wound(s)       Jose Prevention initiated by RN: No  Wound Care Orders initiated by RN: No    Pressure Injury (Stage 3,4, Unstageable, DTI, NWPT, and Complex wounds) if present, place Wound referral order by RN under : No    New Ostomies, if present place, Ostomy referral order under : No     Nurse 1 eSignature: Electronically signed by HUGH LOGAN RN on 4/14/24 at 11:41 PM EDT    **SHARE this note so that the co-signing nurse can place an eSignature**    Nurse 2 eSignature: Electronically signed by Sandrine Greene RN on 4/14/24 at 11:41 PM EDT

## 2024-04-15 NOTE — CARE COORDINATION
resources:    Current services prior to admission: None            Current DME:              Type of Home Care services:  None    ADLS  Prior functional level: Independent in ADLs/IADLs  Current functional level: Independent in ADLs/IADLs    PT AM-PAC:   /24  OT AM-PAC:   /24    Family can provide assistance at DC: Yes  Would you like Case Management to discuss the discharge plan with any other family members/significant others, and if so, who? Yes  Plans to Return to Present Housing: Yes  Other Identified Issues/Barriers to RETURNING to current housing: yes  Potential Assistance needed at discharge: N/A            Potential DME:  none  Patient expects to discharge to: House  Plan for transportation at discharge:  wife    Financial    Payor: BCBS / Plan: BCBS - [place] PPO / Product Type: *No Product type* /     Does insurance require precert for SNF: Yes    Potential assistance Purchasing Medications: No  Meds-to-Beds request: Yes    No Pharmacies Listed    Notes:    Factors facilitating achievement of predicted outcomes: Family support    Barriers to discharge: none    Additional Case Management Notes: see notes    The Plan for Transition of Care is related to the following treatment goals of TIA (transient ischemic attack) [G45.9]      The Patient and/or Patient Representative Agree with the Discharge Plan? yes     Je Tijerina RN  Case Management Department  Ph: [phone] Fax: na

## 2024-04-15 NOTE — CONSULTS
This 63-year-old right-handed man was referred for additional evaluation of a presumed stroke.    He did not remember this event.  His wife witnessed this event and was an excellent historian.    His medications included aspirin, atorvastatin, enoxaparin, ezetimibe, insulins, losartan, pantoprazole, fenofibrate and multivitamins.    His medical history was remarkable for diabetes mellitus and hypertension.  Recent cardiac workup proved unremarkable.  He denied previous strokes or seizures.    He underwent lumbosacral laminectomies years ago.    He denied smoking or drinking.  Unfortunately he slept erratically, he also ate erratically, always missing breakfast.    He was allergic to sulfa drugs.    Review of systems was otherwise unremarkable.    Yesterday, at 11:30 AM the patient was working in his kitchen, when he suddenly felt unsteady and then did not remember the following events.  His wife did witness this episode, noting he was somewhat confused and not answering questions appropriate.  He was filming with his right hand, with possible droopy of the right side of his mouth.  She sat him down, with the symptoms resolving quickly.    He awoke with EMR asking of questions.  He felt back to normal at that time and currently.  He denied previous similar events or present difficulties.  There is no seizure-like activity.    He did wake with a headache, which resolved within hours.    The nursing staff reported he was stable.    His vital signs were stable at present.  His highest blood pressure was 172/91.  He was an elderly man in no acute distress, who was alert, cooperative and oriented.  He was very pleasant.  His skin was unremarkable, with no lymphadenopathy.  Head examination was unremarkable.  His neck was supple without thyromegaly; there were +1 carotid upstrokes without bruits.  His lungs were clear to auscultation.  Cardiac testing proved unrevealing as well.  His abdomen was soft and nontender.